# Patient Record
Sex: FEMALE | Race: WHITE | NOT HISPANIC OR LATINO
[De-identification: names, ages, dates, MRNs, and addresses within clinical notes are randomized per-mention and may not be internally consistent; named-entity substitution may affect disease eponyms.]

---

## 2019-05-23 ENCOUNTER — APPOINTMENT (OUTPATIENT)
Dept: FAMILY MEDICINE | Facility: CLINIC | Age: 59
End: 2019-05-23
Payer: COMMERCIAL

## 2019-05-23 ENCOUNTER — NON-APPOINTMENT (OUTPATIENT)
Age: 59
End: 2019-05-23

## 2019-05-23 VITALS
SYSTOLIC BLOOD PRESSURE: 129 MMHG | HEIGHT: 63.58 IN | WEIGHT: 171 LBS | BODY MASS INDEX: 29.92 KG/M2 | TEMPERATURE: 97.6 F | DIASTOLIC BLOOD PRESSURE: 79 MMHG | HEART RATE: 75 BPM | OXYGEN SATURATION: 100 %

## 2019-05-23 DIAGNOSIS — Z82.3 FAMILY HISTORY OF STROKE: ICD-10-CM

## 2019-05-23 DIAGNOSIS — R68.89 OTHER GENERAL SYMPTOMS AND SIGNS: ICD-10-CM

## 2019-05-23 DIAGNOSIS — Z80.1 FAMILY HISTORY OF MALIGNANT NEOPLASM OF TRACHEA, BRONCHUS AND LUNG: ICD-10-CM

## 2019-05-23 DIAGNOSIS — Z00.00 ENCOUNTER FOR GENERAL ADULT MEDICAL EXAMINATION W/OUT ABNORMAL FINDINGS: ICD-10-CM

## 2019-05-23 DIAGNOSIS — Z78.9 OTHER SPECIFIED HEALTH STATUS: ICD-10-CM

## 2019-05-23 DIAGNOSIS — F98.8 OTHER SPECIFIED BEHAVIORAL AND EMOTIONAL DISORDERS WITH ONSET USUALLY OCCURRING IN CHILDHOOD AND ADOLESCENCE: ICD-10-CM

## 2019-05-23 DIAGNOSIS — Z82.49 FAMILY HISTORY OF ISCHEMIC HEART DISEASE AND OTHER DISEASES OF THE CIRCULATORY SYSTEM: ICD-10-CM

## 2019-05-23 DIAGNOSIS — Z86.19 PERSONAL HISTORY OF OTHER INFECTIOUS AND PARASITIC DISEASES: ICD-10-CM

## 2019-05-23 DIAGNOSIS — Z87.891 PERSONAL HISTORY OF NICOTINE DEPENDENCE: ICD-10-CM

## 2019-05-23 DIAGNOSIS — G43.909 MIGRAINE, UNSPECIFIED, NOT INTRACTABLE, W/OUT STATUS MIGRAINOSUS: ICD-10-CM

## 2019-05-23 DIAGNOSIS — G47.00 INSOMNIA, UNSPECIFIED: ICD-10-CM

## 2019-05-23 PROCEDURE — 99204 OFFICE O/P NEW MOD 45 MIN: CPT | Mod: 25

## 2019-05-23 PROCEDURE — 93000 ELECTROCARDIOGRAM COMPLETE: CPT

## 2019-05-23 RX ORDER — CHROMIUM 200 MCG
TABLET ORAL
Refills: 0 | Status: ACTIVE | COMMUNITY

## 2019-05-23 RX ORDER — VALACYCLOVIR 1 G/1
1 TABLET, FILM COATED ORAL
Qty: 30 | Refills: 1 | Status: ACTIVE | COMMUNITY

## 2019-05-23 RX ORDER — ASPIRIN 325 MG/1
TABLET, FILM COATED ORAL
Refills: 0 | Status: ACTIVE | COMMUNITY

## 2019-05-23 NOTE — PHYSICAL EXAM
[No Acute Distress] : no acute distress [Normal Sclera/Conjunctiva] : normal sclera/conjunctiva [Normal Outer Ear/Nose] : the outer ears and nose were normal in appearance [Supple] : supple [No Respiratory Distress] : no respiratory distress  [Clear to Auscultation] : lungs were clear to auscultation bilaterally [No Accessory Muscle Use] : no accessory muscle use [Normal Rate] : normal rate  [Regular Rhythm] : with a regular rhythm [Normal S1, S2] : normal S1 and S2 [No Carotid Bruits] : no carotid bruits [No Edema] : there was no peripheral edema [No CVA Tenderness] : no CVA  tenderness [No Joint Swelling] : no joint swelling [Grossly Normal Strength/Tone] : grossly normal strength/tone [No Rash] : no rash [Normal Gait] : normal gait [Coordination Grossly Intact] : coordination grossly intact [No Focal Deficits] : no focal deficits [Speech Grossly Normal] : speech grossly normal [Memory Grossly Normal] : memory grossly normal [Normal Affect] : the affect was normal [Alert and Oriented x3] : oriented to person, place, and time [Normal Mood] : the mood was normal [Normal Insight/Judgement] : insight and judgment were intact

## 2019-05-23 NOTE — REVIEW OF SYSTEMS
[Chest Pain] : chest pain [Negative] : Heme/Lymph [FreeTextEntry5] : p [de-identified] : distractibility

## 2019-05-23 NOTE — HISTORY OF PRESENT ILLNESS
[FreeTextEntry8] : 60 yo female here for establishment of care. \par \par H/o herpes for many years. 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 Patient stating that multiple people in her building are "not feeling right" due to this. Stating that they are loulou hammering 9-5 every day. Patient feels that she at times stops in the middle of what she's doing and doesn't remember what she was about to do. This has been going on for a few months. FH of dementia in her mother. 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 H/o ADHD taking Ritalin in the past which helped. Also with some history of insomnia. Stating she is a night person, stays up late. No slurred speech, dizziness, weakness, or facial droop.\par \par Also with complaint of chest tightness and feels pulses pounding in her neck at times. Patient works out at the gym and does Pilates, as well as frequent walking. No CP when exercising. No SOB. No weight changes, hot or cold intolerance, or severe fatigue. No skin changes or hair loss. Symptoms are on and off. Stating it feels like it might be anxiety. Happens briefly for a few times daily. Lasts for a few seconds and then passes.

## 2019-06-02 ENCOUNTER — FORM ENCOUNTER (OUTPATIENT)
Age: 59
End: 2019-06-02

## 2019-06-03 ENCOUNTER — APPOINTMENT (OUTPATIENT)
Dept: ULTRASOUND IMAGING | Facility: CLINIC | Age: 59
End: 2019-06-03
Payer: COMMERCIAL

## 2019-06-03 ENCOUNTER — APPOINTMENT (OUTPATIENT)
Dept: MAMMOGRAPHY | Facility: CLINIC | Age: 59
End: 2019-06-03
Payer: COMMERCIAL

## 2019-06-03 ENCOUNTER — OUTPATIENT (OUTPATIENT)
Dept: OUTPATIENT SERVICES | Facility: HOSPITAL | Age: 59
LOS: 1 days | End: 2019-06-03

## 2019-06-03 PROCEDURE — 77067 SCR MAMMO BI INCL CAD: CPT | Mod: 26

## 2019-06-03 PROCEDURE — 77063 BREAST TOMOSYNTHESIS BI: CPT | Mod: 26

## 2019-06-03 PROCEDURE — 93880 EXTRACRANIAL BILAT STUDY: CPT | Mod: 26

## 2019-06-12 ENCOUNTER — TRANSCRIPTION ENCOUNTER (OUTPATIENT)
Age: 59
End: 2019-06-12

## 2019-06-12 ENCOUNTER — APPOINTMENT (OUTPATIENT)
Dept: HEART AND VASCULAR | Facility: CLINIC | Age: 59
End: 2019-06-12
Payer: COMMERCIAL

## 2019-06-12 VITALS
SYSTOLIC BLOOD PRESSURE: 123 MMHG | HEIGHT: 63 IN | HEART RATE: 74 BPM | BODY MASS INDEX: 30.3 KG/M2 | OXYGEN SATURATION: 95 % | DIASTOLIC BLOOD PRESSURE: 80 MMHG | WEIGHT: 171 LBS

## 2019-06-12 DIAGNOSIS — R07.89 OTHER CHEST PAIN: ICD-10-CM

## 2019-06-12 DIAGNOSIS — R00.1 BRADYCARDIA, UNSPECIFIED: ICD-10-CM

## 2019-06-12 PROCEDURE — 99204 OFFICE O/P NEW MOD 45 MIN: CPT

## 2019-06-12 NOTE — PHYSICAL EXAM
[Normal Appearance] : normal appearance [General Appearance - Well Developed] : well developed [Well Groomed] : well groomed [General Appearance - Well Nourished] : well nourished [No Deformities] : no deformities [General Appearance - In No Acute Distress] : no acute distress [Normal Conjunctiva] : the conjunctiva exhibited no abnormalities [Eyelids - No Xanthelasma] : the eyelids demonstrated no xanthelasmas [Normal Oral Mucosa] : normal oral mucosa [No Oral Pallor] : no oral pallor [No Oral Cyanosis] : no oral cyanosis [Normal Jugular Venous A Waves Present] : normal jugular venous A waves present [Normal Jugular Venous V Waves Present] : normal jugular venous V waves present [No Jugular Venous Martin A Waves] : no jugular venous martin A waves [Respiration, Rhythm And Depth] : normal respiratory rhythm and effort [Exaggerated Use Of Accessory Muscles For Inspiration] : no accessory muscle use [Auscultation Breath Sounds / Voice Sounds] : lungs were clear to auscultation bilaterally [Abdomen Tenderness] : non-tender [Abdomen Soft] : soft [Abdomen Mass (___ Cm)] : no abdominal mass palpated [Abnormal Walk] : normal gait [Gait - Sufficient For Exercise Testing] : the gait was sufficient for exercise testing [Cyanosis, Localized] : no localized cyanosis [Nail Clubbing] : no clubbing of the fingernails [Petechial Hemorrhages (___cm)] : no petechial hemorrhages [] : no ischemic changes [Mood] : the mood was normal [Oriented To Time, Place, And Person] : oriented to person, place, and time [Affect] : the affect was normal [No Anxiety] : not feeling anxious

## 2019-06-12 NOTE — REASON FOR VISIT
[Initial Evaluation] : an initial evaluation of [Chest Pain] : chest pain [FreeTextEntry1] : Ms. CANNON is 59 year woman who presents secondary chest discomfort. Her symptoms started about 2 months ago. She describes the pain as mid-sternal, discomfort that was pressure like in nature. Symptoms are worse with activity and improved at rest. They are associated with a shortness of breath. No prior work up reported.\par

## 2019-06-27 ENCOUNTER — APPOINTMENT (OUTPATIENT)
Dept: HEART AND VASCULAR | Facility: CLINIC | Age: 59
End: 2019-06-27

## 2019-07-01 ENCOUNTER — APPOINTMENT (OUTPATIENT)
Dept: GASTROENTEROLOGY | Facility: CLINIC | Age: 59
End: 2019-07-01
Payer: COMMERCIAL

## 2019-07-01 VITALS
DIASTOLIC BLOOD PRESSURE: 91 MMHG | BODY MASS INDEX: 30.3 KG/M2 | HEIGHT: 63 IN | WEIGHT: 171 LBS | OXYGEN SATURATION: 97 % | SYSTOLIC BLOOD PRESSURE: 159 MMHG | TEMPERATURE: 97.5 F | HEART RATE: 77 BPM

## 2019-07-01 DIAGNOSIS — Z12.11 ENCOUNTER FOR SCREENING FOR MALIGNANT NEOPLASM OF COLON: ICD-10-CM

## 2019-07-01 PROCEDURE — 99204 OFFICE O/P NEW MOD 45 MIN: CPT

## 2019-07-08 ENCOUNTER — FORM ENCOUNTER (OUTPATIENT)
Age: 59
End: 2019-07-08

## 2019-07-09 ENCOUNTER — OUTPATIENT (OUTPATIENT)
Dept: OUTPATIENT SERVICES | Facility: HOSPITAL | Age: 59
LOS: 1 days | End: 2019-07-09
Payer: COMMERCIAL

## 2019-07-09 DIAGNOSIS — R07.9 CHEST PAIN, UNSPECIFIED: ICD-10-CM

## 2019-07-09 PROCEDURE — 93351 STRESS TTE COMPLETE: CPT

## 2019-07-09 PROCEDURE — 93325 DOPPLER ECHO COLOR FLOW MAPG: CPT | Mod: 26

## 2019-07-09 PROCEDURE — 93320 DOPPLER ECHO COMPLETE: CPT | Mod: 26

## 2019-07-09 PROCEDURE — 93016 CV STRESS TEST SUPVJ ONLY: CPT

## 2019-07-09 PROCEDURE — 93018 CV STRESS TEST I&R ONLY: CPT

## 2019-07-09 PROCEDURE — 93350 STRESS TTE ONLY: CPT | Mod: 26

## 2019-08-13 ENCOUNTER — RESULT REVIEW (OUTPATIENT)
Age: 59
End: 2019-08-13

## 2019-08-13 ENCOUNTER — OUTPATIENT (OUTPATIENT)
Dept: OUTPATIENT SERVICES | Facility: HOSPITAL | Age: 59
LOS: 1 days | Discharge: HOME | End: 2019-08-13
Payer: COMMERCIAL

## 2019-08-13 ENCOUNTER — APPOINTMENT (OUTPATIENT)
Dept: GASTROENTEROLOGY | Facility: HOSPITAL | Age: 59
End: 2019-08-13

## 2019-08-13 PROCEDURE — 45385 COLONOSCOPY W/LESION REMOVAL: CPT

## 2019-08-13 PROCEDURE — 88305 TISSUE EXAM BY PATHOLOGIST: CPT

## 2019-08-14 LAB — SURGICAL PATHOLOGY STUDY: SIGNIFICANT CHANGE UP

## 2021-11-04 ENCOUNTER — OUTPATIENT (OUTPATIENT)
Dept: OUTPATIENT SERVICES | Facility: HOSPITAL | Age: 61
LOS: 1 days | End: 2021-11-04

## 2021-11-04 ENCOUNTER — APPOINTMENT (OUTPATIENT)
Dept: MAMMOGRAPHY | Facility: CLINIC | Age: 61
End: 2021-11-04
Payer: COMMERCIAL

## 2021-11-04 PROCEDURE — 77067 SCR MAMMO BI INCL CAD: CPT | Mod: 26

## 2021-11-04 PROCEDURE — 77063 BREAST TOMOSYNTHESIS BI: CPT | Mod: 26

## 2021-12-24 ENCOUNTER — TRANSCRIPTION ENCOUNTER (OUTPATIENT)
Age: 61
End: 2021-12-24

## 2022-12-19 ENCOUNTER — APPOINTMENT (OUTPATIENT)
Dept: MAMMOGRAPHY | Facility: CLINIC | Age: 62
End: 2022-12-19

## 2023-10-09 ENCOUNTER — EMERGENCY (EMERGENCY)
Facility: HOSPITAL | Age: 63
LOS: 1 days | Discharge: HOME | End: 2023-10-09
Admitting: EMERGENCY MEDICINE
Payer: COMMERCIAL

## 2023-10-09 VITALS
OXYGEN SATURATION: 97 % | DIASTOLIC BLOOD PRESSURE: 81 MMHG | HEART RATE: 75 BPM | SYSTOLIC BLOOD PRESSURE: 178 MMHG | TEMPERATURE: 98 F | RESPIRATION RATE: 16 BRPM

## 2023-10-09 VITALS
WEIGHT: 134.92 LBS | SYSTOLIC BLOOD PRESSURE: 186 MMHG | DIASTOLIC BLOOD PRESSURE: 80 MMHG | OXYGEN SATURATION: 98 % | HEART RATE: 89 BPM | TEMPERATURE: 98 F | RESPIRATION RATE: 18 BRPM

## 2023-10-09 PROCEDURE — 99284 EMERGENCY DEPT VISIT MOD MDM: CPT

## 2023-10-09 PROCEDURE — 70486 CT MAXILLOFACIAL W/O DYE: CPT | Mod: 26

## 2023-10-09 PROCEDURE — 70450 CT HEAD/BRAIN W/O DYE: CPT | Mod: 26

## 2023-10-09 PROCEDURE — 72125 CT NECK SPINE W/O DYE: CPT | Mod: 26

## 2023-10-09 PROCEDURE — 71250 CT THORAX DX C-: CPT | Mod: 26

## 2023-10-09 NOTE — ED ADULT NURSE NOTE - NSFALLRISKINTERV_ED_ALL_ED

## 2023-10-09 NOTE — ED PROVIDER NOTE - CLINICAL SUMMARY MEDICAL DECISION MAKING FREE TEXT BOX
63-year-old female, denies significant history, on daily aspirin as a cardioprotective measure, presenting to the emergency room after slip and fall in the bathroom yesterday while at work. Patient is noted to have tenderness to palpation along the left upper chest wall as well as the left lateral neck the right lateral neck and right parietal region of the scalp.  Will plan to obtain CT imaging of the head, maxillofacial, cervical spine, and chest wall.  She is found to have some bruising along the left dorsal hand, however when offered x-rays of the area, patient states her pain is not severe and she does not believe it to be broken, therefore refusing the x-ray.  Patient also refusing EKG stating she does not believe her chest wall pain to be cardiac.  Shared decision making involved and patient instructed to return to the ED if she develops any worsening of her chest wall or hand pain.  Will reassess and dispo pending medical work-up.

## 2023-10-09 NOTE — ED ADULT NURSE REASSESSMENT NOTE - NS ED NURSE REASSESS COMMENT FT1
Patient refusing EKG. Patient reinforced and educated the need for ekg. Patient verbalized understanding. ELIZABET Grant made aware. Care ongoing.

## 2023-10-09 NOTE — ED PROVIDER NOTE - NS ED ROS FT
+head pain, R lateral neck pain, left upper chest wall pain; +bruise over the L dorsal hand  Denies fevers, chills, nausea, vomiting, diarrhea, constipation, abdominal pain, urinary symptoms, palpitations, shortness of breath, dyspnea on exertion, syncope/near syncope, cough/URI symptoms, headache, weakness, numbness, focal deficits, visual changes, gait or balance changes, dizziness

## 2023-10-09 NOTE — ED PROVIDER NOTE - PROGRESS NOTE DETAILS
No acute findings on patient's CT imaging.  We will plan to discharge and recommend follow-up with primary care doctor.  Return precautions discussed and patient stable as she leaves.

## 2023-10-09 NOTE — ED PROVIDER NOTE - PATIENT PORTAL LINK FT
You can access the FollowMyHealth Patient Portal offered by [facility] by registering at the following website: [email or web address]. By joining [employer]’s FollowMyHealth portal, you will also be able to view your health information using other applications (apps) compatible with our system.

## 2023-10-09 NOTE — ED ADULT NURSE NOTE - OBJECTIVE STATEMENT
Patient is a 64 y/o F c/o fall. patient c/o fall that happened yesterday in bathroom at work. patient reports head strike and has bruising to left side of head. Patient also c/o left hand bruising and left sided rib pain. patient reports taking Aspirin daily but otherwise no other anticoagulants. Patient aaox4 and is able to ambulate with steady gait. patient also has left orbital bruising.

## 2023-10-09 NOTE — ED PROVIDER NOTE - PHYSICAL EXAMINATION
VITAL SIGNS: I have reviewed nursing notes and confirm.  CONSTITUTIONAL: Well-developed; well-nourished; in no acute distress.  SKIN: Skin is warm and dry, no acute rash.  HEAD: Normocephalic; Tenderness to palpation along the right parietal region without obvious hematoma or skin lesion.  NECK: Supple; +R Cervical paraspinal tenderness to palpation with full range of motion  CARD: S1, S2 normal; no murmurs, gallops, or rubs. Regular rate and rhythm.  RESP: No wheezes, rales or rhonchi.  SPINE: No spinal ttp and FROM.  EXT: Normal ROM. No clubbing, cyanosis or edema.  NEURO: Alert, oriented. Grossly unremarkable. CASIANO, normal tone, no gross motor or sensory changes. Fluent speech.   PSYCH: Cooperative, appropriate. Mood and affect wnl.

## 2023-10-09 NOTE — ED PROVIDER NOTE - NSFOLLOWUPINSTRUCTIONS_ED_ALL_ED_FT
Understanding Your Risk for Falls  Each year, millions of people have serious injuries from falls. It is important to understand your risk for falling. Talk with your health care provider about your risk and what you can do to lower it. There are actions you can take at home to lower your risk and prevent falls.    If you do have a serious fall, make sure to tell your health care provider. Falling once raises your risk of falling again.    How can falls affect me?  Serious injuries from falls are common. These include:  Broken bones, such as hip fractures.  Head injuries, such as traumatic brain injuries (TBI) or concussion.  A fear of falling can cause you to avoid activities and stay at home. This can make your muscles weaker and actually raise your risk for a fall.    What can increase my risk?  There are a number of risk factors that increase your risk for falling. The more risk factors you have, the higher your risk of falling. Serious injuries from a fall happen most often to people older than age 65. Children and young adults ages 15–29 are also at higher risk.    Common risk factors include:  Weakness in the lower body.  Lack (deficiency) of vitamin D.  Being generally weak or confused due to long-term (chronic) illness.  Dizziness or balance problems.  Poor vision.  Medicines that cause dizziness or drowsiness. These can include medicines for your blood pressure, heart, anxiety, insomnia, or edema, as well as pain medicines and muscle relaxants.  Other risk factors include:  Drinking alcohol.  Having had a fall in the past.  Having depression.  Having foot pain or wearing improper footwear.  Working at a dangerous job.  Having any of the following in your home:  Tripping hazards, such as floor clutter or loose rugs.  Poor lighting.  Pets.  Having dementia or memory loss.  What actions can I take to lower my risk of falling?      Physical activity    Maintain physical fitness. Do strength and balance exercises. Consider taking a regular class to build strength and balance. Yoga and raffy chi are good options.    Vision    Have your eyes checked every year and your vision prescription updated as needed.    Walking aids and footwear    Wear nonskid shoes. Do not wear high heels.  Do not walk around the house in socks or slippers.  Use a cane or walker as told by your health care provider.  Home safety    Attach secure railings on both sides of your stairs.  Install grab bars for your tub, shower, and toilet. Use a bath mat in your tub or shower.  Use good lighting in all rooms. Keep a flashlight near your bed.  Make sure there is a clear path from your bed to the bathroom. Use night-lights.  Do not use throw rugs. Make sure all carpeting is taped or tacked down securely.  Remove all clutter from walkways and stairways, including extension cords.  Repair uneven or broken steps.  Avoid walking on icy or slippery surfaces. Walk on the grass instead of on icy or slick sidewalks. Use ice melt to get rid of ice on walkways.  Use a cordless phone.  Questions to ask your health care provider  Can you help me check my risk for a fall?  Do any of my medicines make me more likely to fall?  Should I take a vitamin D supplement?  What exercises can I do to improve my strength and balance?  Should I make an appointment to have my vision checked?  Do I need a bone density test to check for weak bones or osteoporosis?  Would it help to use a cane or a walker?  Where to find more information  Centers for Disease Control and Prevention, [place]: www.cdc.gov  Community-Based Fall Prevention Programs: www.cdc.gov  National [place] on Aging: www.williams.nih.gov  Contact a health care provider if:  You fall at home.  You are afraid of falling at home.  You feel weak, drowsy, or dizzy.  Summary  Serious injuries from a fall happen most often to people older than age 65. Children and young adults ages 15–29 are also at higher risk.  Talk with your health care provider about your risks for falling and how to lower those risks.  Taking certain precautions at home can lower your risk for falling.  If you fall, always tell your health care provider.  This information is not intended to replace advice given to you by your health care provider. Make sure you discuss any questions you have with your health care provider.

## 2023-10-09 NOTE — ED PROVIDER NOTE - OBJECTIVE STATEMENT
63-year-old female, denies significant history, on daily aspirin as a cardioprotective measure, presenting to the emergency room after slip and fall in the bathroom yesterday while at work.  Patient works as a  in the [employer] when she excellently slipped and fell hitting the right side of her head.  She denies LOC in the event and was able to get up afterwards, however has noticed subsequent bruising below the left eye as well as on the left dorsum of her hand.  Patient is right-hand dominant.  She has not taken any medications for pain relief.  She initially thought she was fine but decided to get checked out due to the fall she sustained and her chronic aspirin use.  She also endorses some mild right-sided lateral neck pain and pain along the left upper lateral chest wall.  Denies headaches, dizziness, changes in vision, nausea, vomiting, fevers, chest pain or shortness of breath.

## 2023-10-09 NOTE — ED ADULT TRIAGE NOTE - CHIEF COMPLAINT QUOTE
here for mechanical slip and fall while using the bathroom at work yesterday. - pt with head strike, left sided rib pain, left occipital bruising and left hand bruising- pt is a+Ox3 denies LOC, neck pain or change in vision, takes aspirin daily

## 2023-10-11 DIAGNOSIS — Z79.82 LONG TERM (CURRENT) USE OF ASPIRIN: ICD-10-CM
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
## 2023-10-11 DIAGNOSIS — M54.2 CERVICALGIA: ICD-10-CM
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
## 2023-10-11 DIAGNOSIS — R07.89 OTHER CHEST PAIN: ICD-10-CM

## 2023-10-11 DIAGNOSIS — S09.90XA UNSPECIFIED INJURY OF HEAD, INITIAL ENCOUNTER: ICD-10-CM

## 2023-10-11 DIAGNOSIS — S60.222A CONTUSION OF LEFT HAND, INITIAL ENCOUNTER: ICD-10-CM
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
## 2023-10-12 ENCOUNTER — EMERGENCY (EMERGENCY)
Facility: HOSPITAL | Age: 63
LOS: 1 days | Discharge: HOME | End: 2023-10-12
Admitting: EMERGENCY MEDICINE
Payer: COMMERCIAL

## 2023-10-12 VITALS
RESPIRATION RATE: 16 BRPM | SYSTOLIC BLOOD PRESSURE: 166 MMHG | HEART RATE: 81 BPM | TEMPERATURE: 98 F | DIASTOLIC BLOOD PRESSURE: 80 MMHG | OXYGEN SATURATION: 98 % | WEIGHT: 160.06 LBS | HEIGHT: 66 IN

## 2023-10-12 PROCEDURE — 71046 X-RAY EXAM CHEST 2 VIEWS: CPT | Mod: 26

## 2023-10-12 PROCEDURE — 99284 EMERGENCY DEPT VISIT MOD MDM: CPT

## 2023-10-12 NOTE — ED PROVIDER NOTE - PATIENT PORTAL LINK FT
You can access the FollowMyHealth Patient Portal offered by [facility] by registering at the following website: [email or web address]. By joining [employer]’s FollowMyHealth portal, you will also be able to view your health information using other applications (apps) compatible with our system.

## 2023-10-12 NOTE — ED ADULT TRIAGE NOTE - CHIEF COMPLAINT QUOTE
Pt states trip/ fall tuesday, was seen here and discharged home. Pt states increased left rib and axillary pain. Denies sob or cough. Pt did not take any pain medications this am.

## 2023-10-12 NOTE — ED ADULT NURSE NOTE - NSFALLUNIVINTERV_ED_ALL_ED
Bed/Stretcher in lowest position, wheels locked, appropriate side rails in place/Call bell, personal items and telephone in reach/Instruct patient to call for assistance before getting out of bed/chair/stretcher/Non-slip footwear applied when patient is off stretcher/[place] to call system/Physically safe environment - no spills, clutter or unnecessary equipment/Purposeful proactive rounding/Room/bathroom lighting operational, light cord in reach

## 2023-10-12 NOTE — ED PROVIDER NOTE - MUSCULOSKELETAL, MLM
Spine appears normal, range of motion is not limited, + mild tenderness to anterolateral left chest wall without crepitus

## 2023-10-12 NOTE — ED ADULT NURSE NOTE - OBJECTIVE STATEMENT
64 y/o F here with left rib and axillary pain. Denies sob or cough. Pt did not take any pain medications this am. A&Ox4. Ambulatory. NKA

## 2023-10-12 NOTE — ED PROVIDER NOTE - NSFOLLOWUPINSTRUCTIONS_ED_ALL_ED_FT
USE THE INCENTIVE SPIROMETER TO PROTECT YOUR LUNGS AND PROMOTE DEEP BREATHING.     TAKE 650MG TYLENOL EVERY 6 HOURS AS NEEDED FOR PAIN.     TAKE IBUPROFEN 600MG EVERY 6 HOURS WITH FOOD AS NEEDED FOR PAIN.     AVOID HEAVY LIFTING (IF IT HURTS, DON'T LIFT IT) FOR THE NEXT 3-5 DAYS UNTIL FEELING IMPROVED.       DISCHARGE INSTRUCTIONS:    Call 911 for any of the following:     You have trouble breathing.      You have new or increased pain.    Return to the emergency department if:     Your pain does not get better, even after treatment.      You have a fever or a cough.     Contact your healthcare provider if:     You have questions or concerns about your condition or care.        Medicines:     NSAIDs help decrease swelling and pain. NSAIDs are available without a doctor's order. Ask your healthcare provider which medicine is right for you. Ask how much to take and when to take it. Take as directed. NSAIDs can cause stomach bleeding and kidney problems if not taken correctly.      Prescription pain medicine may be given. Ask your healthcare provider how to take this medicine safely. Some prescription pain medicines contain acetaminophen. Do not take other medicines that contain acetaminophen without talking to your healthcare provider. Too much acetaminophen may cause liver damage. Prescription pain medicine may cause constipation. Ask your healthcare provider how to prevent or treat constipation.       Take your medicine as directed. Contact your healthcare provider if you think your medicine is not helping or if you have side effects. Tell him or her if you are allergic to any medicine. Keep a list of the medicines, vitamins, and herbs you take. Include the amounts, and when and why you take them. Bring the list or the pill bottles to follow-up visits. Carry your medicine list with you in case of an emergency.    Follow up with your healthcare provider as directed: Write down your questions so you remember to ask them during your visits.    Deep breathing: Deep breathing will decrease your risk for pneumonia. Hug a pillow on the injured side while doing this exercise, to decrease pain. Take a deep breath and hold it for as long as possible. You should let the air out and then cough strongly. Deep breaths help open your airway. You may be given an incentive spirometer to help take deep breaths. Put the plastic piece in your mouth. Take a slow, deep breath. You should then let the air out and cough. Repeat these steps 10 times every hour.    Rest: Rest and limit activity to decrease swelling and pain, and allow your injury to heal. Avoid activities that may cause more pain or damage to your ribs such as, pulling, pushing, and lifting. As your pain decreases, begin movements slowly. Take short walks between rest periods.    Ice: Apply ice on the fractured area for 15 to 20 minutes every hour or as directed. Use an ice pack or put crushed ice in a plastic bag. Cover it with a towel. Ice helps prevent tissue damage and decreases swelling and pain.

## 2023-10-12 NOTE — ED PROVIDER NOTE - CLINICAL SUMMARY MEDICAL DECISION MAKING FREE TEXT BOX
pt presents with persistent left anterolateral chest wall pain after mechanical trip and fall. seen here on day of fall with negative ct chest. discussed results but given persistent pain and discomfort with breathing will obtain cxr to ensure no interval effusion or ptx. cxr negative. will d/c.

## 2023-10-12 NOTE — ED PROVIDER NOTE - OBJECTIVE STATEMENT
64yo F presents with left anterior chest wall pain after fall 2 days ago. pt was seen here at that time and had imaging done but did not know she had already had a CT chest. she denies fever, chills, cough, sob but reports some increased discomfort with deep breathing. denies ha, vision changes, nausea, vomiting, numbness, weakness, any other concerns.

## 2023-10-16 DIAGNOSIS — R07.89 OTHER CHEST PAIN: ICD-10-CM

## 2025-03-08 ENCOUNTER — EMERGENCY (EMERGENCY)
Facility: HOSPITAL | Age: 65
LOS: 1 days | Discharge: HOME | End: 2025-03-08
Attending: STUDENT IN AN ORGANIZED HEALTH CARE EDUCATION/TRAINING PROGRAM | Admitting: STUDENT IN AN ORGANIZED HEALTH CARE EDUCATION/TRAINING PROGRAM
Payer: COMMERCIAL

## 2025-03-08 VITALS
OXYGEN SATURATION: 99 % | HEIGHT: 65 IN | RESPIRATION RATE: 16 BRPM | SYSTOLIC BLOOD PRESSURE: 202 MMHG | HEART RATE: 69 BPM | TEMPERATURE: 98 F | DIASTOLIC BLOOD PRESSURE: 83 MMHG | WEIGHT: 149.91 LBS

## 2025-03-08 DIAGNOSIS — S42.402A UNSPECIFIED FRACTURE OF LOWER END OF LEFT HUMERUS, INITIAL ENCOUNTER FOR CLOSED FRACTURE: ICD-10-CM

## 2025-03-08 DIAGNOSIS — M25.522 PAIN IN LEFT ELBOW: ICD-10-CM
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
## 2025-03-08 DIAGNOSIS — M25.512 PAIN IN LEFT SHOULDER: ICD-10-CM

## 2025-03-08 PROCEDURE — 73030 X-RAY EXAM OF SHOULDER: CPT | Mod: 26

## 2025-03-08 PROCEDURE — 73060 X-RAY EXAM OF HUMERUS: CPT | Mod: 26,LT

## 2025-03-08 PROCEDURE — 73080 X-RAY EXAM OF ELBOW: CPT | Mod: 26,LT

## 2025-03-08 PROCEDURE — 99285 EMERGENCY DEPT VISIT HI MDM: CPT

## 2025-03-08 RX ORDER — OXYCODONE HYDROCHLORIDE 30 MG/1
5 TABLET ORAL ONCE
Refills: 0 | Status: DISCONTINUED | OUTPATIENT
Start: 2025-03-08 | End: 2025-03-08

## 2025-03-08 RX ORDER — ACETAMINOPHEN 500 MG/5ML
1000 LIQUID (ML) ORAL ONCE
Refills: 0 | Status: COMPLETED | OUTPATIENT
Start: 2025-03-08 | End: 2025-03-08

## 2025-03-08 RX ADMIN — Medication 1000 MILLIGRAM(S): at 23:03

## 2025-03-08 RX ADMIN — OXYCODONE HYDROCHLORIDE 5 MILLIGRAM(S): 30 TABLET ORAL at 23:04

## 2025-03-08 NOTE — ED ADULT NURSE NOTE - NSFALLRISKINTERV_ED_ALL_ED

## 2025-03-08 NOTE — ED ADULT TRIAGE NOTE - CHIEF COMPLAINT QUOTE
L elbow pain s/p single mechanical GLF. No headstrike reported. Swelling noted @ elbow but no gross deformity. Distal PMS intact. Denies shoulder, head, or neck pain. No preceding dizziness or weakness prior to the fall.

## 2025-03-08 NOTE — ED ADULT NURSE NOTE - OBJECTIVE STATEMENT
66 yo female c/o L elbow pain after falling. Pt reports she was leaving work trying to catch the train and she tripped, falling on her L arm. Denies numbness/tingling.

## 2025-03-08 NOTE — ED ADULT TRIAGE NOTE - WEIGHT IN LBS
How Severe Is It?: mild
Is This A New Presentation, Or A Follow-Up?: Rash
Additional History: Not active today
149.9

## 2025-03-09 VITALS
TEMPERATURE: 98 F | DIASTOLIC BLOOD PRESSURE: 73 MMHG | RESPIRATION RATE: 18 BRPM | SYSTOLIC BLOOD PRESSURE: 166 MMHG | OXYGEN SATURATION: 98 % | HEART RATE: 72 BPM

## 2025-03-09 PROCEDURE — 73090 X-RAY EXAM OF FOREARM: CPT | Mod: 26,LT,76

## 2025-03-09 PROCEDURE — 73060 X-RAY EXAM OF HUMERUS: CPT | Mod: 26,LT

## 2025-03-09 PROCEDURE — 73110 X-RAY EXAM OF WRIST: CPT | Mod: 26,LT

## 2025-03-09 PROCEDURE — 73090 X-RAY EXAM OF FOREARM: CPT

## 2025-03-09 PROCEDURE — 73080 X-RAY EXAM OF ELBOW: CPT

## 2025-03-09 PROCEDURE — 99285 EMERGENCY DEPT VISIT HI MDM: CPT | Mod: 25

## 2025-03-09 PROCEDURE — 24620 CLTX MONTEGGIA FX DISLC ELBW: CPT | Mod: LT

## 2025-03-09 PROCEDURE — 73060 X-RAY EXAM OF HUMERUS: CPT

## 2025-03-09 PROCEDURE — 73030 X-RAY EXAM OF SHOULDER: CPT

## 2025-03-09 PROCEDURE — 73080 X-RAY EXAM OF ELBOW: CPT | Mod: 26,LT

## 2025-03-09 PROCEDURE — 73110 X-RAY EXAM OF WRIST: CPT

## 2025-03-09 RX ORDER — LIDOCAINE HCL/PF 10 MG/ML
10 VIAL (ML) INJECTION ONCE
Refills: 0 | Status: COMPLETED | OUTPATIENT
Start: 2025-03-09 | End: 2025-03-09

## 2025-03-09 RX ORDER — OXYCODONE HYDROCHLORIDE 30 MG/1
1 TABLET ORAL
Qty: 6 | Refills: 0
Start: 2025-03-09 | End: 2025-03-11

## 2025-03-09 RX ADMIN — Medication 10 MILLILITER(S): at 04:40

## 2025-03-09 RX ADMIN — Medication 10 MILLILITER(S): at 01:49

## 2025-03-09 NOTE — ED PROVIDER NOTE - CLINICAL SUMMARY MEDICAL DECISION MAKING FREE TEXT BOX
no medical history, s/p trip and fall just prior to arrival. fell onto L elbow. no head strike, no preceding symptoms. severe pain through L elbow and upper arm. swelling to arm. no extremity weakness / numbness. denies other injuries. has been able to bear weight since.   pt appears uncomfortable but nontoxic, hypertensive (suspect 2/2 pain), vitals otherwise ok, exam LUE - skin intact, L elbow w swelling, ecchymosis, tenderness, limited ROM. shoulder, wrist, hand w/o tenderness. no snuff box tenderness. neurovascularly intact     high concern for elbow fracture vs dislocation.   will xr remainder of extremity consider proximal humerus fracture.   injuries all focal to LUE - hx and exam otherwise w/o evidence of trauma to require further imaging.   plan - xray  analgesia prn  likely ortho

## 2025-03-09 NOTE — ED PROVIDER NOTE - WR INTERPRETATION DATE TIME  1
Received [facility] paperwork to be completed-Dr Fisher and Dr Shukla   09-Mar-2025 06:01

## 2025-03-09 NOTE — ED PROVIDER NOTE - PROGRESS NOTE DETAILS
xr showing L elbow fracture.   ortho consulted - splinted in ED.   post reduction XR okayed. requesting CT for further evaluation.   CT done, cleared for dc by ortho. remains neurovascularly intact in splint. given sling for comfort   ok for dc and outpt follow w ortho. given follow up info. xr showing L elbow fracture.   ortho consulted - splinted in ED.   post reduction XR okayed. requesting CT for further evaluation.   CT done, cleared for dc by ortho. remains neurovascularly intact in splint. given sling for comfort   ok for dc and outpt follow w ortho. given follow up info.    All results reviewed with the patient verbally. Discharge plan and return precautions d/w pt who verbalized understanding and agrees with plan. All questions answered. Vitals WNL. Ready for d/c.

## 2025-03-09 NOTE — ED PROVIDER NOTE - PATIENT PORTAL LINK FT
You can access the FollowMyHealth Patient Portal offered by [facility] by registering at the following website: [email or web address]. By joining [employer]’s FollowMyHealth portal, you will also be able to view your health information using other applications (apps) compatible with our system.

## 2025-03-09 NOTE — ED PROVIDER NOTE - ATTENDING APP SHARED VISIT CONTRIBUTION OF CARE
64 yo F presents s/p fall onto L elbow p/w severe L elbow and upper arm pain, swelling, and ecchymosis.  No head strike, preceding symptoms, extremity weakness, numbness, or other injuries reported. Able to bear weight.  Appears uncomfortable but nontoxic; hypertensive (likely pain-related); otherwise stable vitals. Exam shows L elbow swelling, ecchymosis, tenderness, and limited ROM; remaining LUE unremarkable.  Plan: x-ray (elbow, consider proximal humerus); analgesia; likely orthopedics consult.  High concern for elbow fracture/dislocation.

## 2025-03-09 NOTE — ED PROVIDER NOTE - PHYSICAL EXAMINATION
LUE - skin intact, +swelling at elbow, ecchymosis. bony tenderness throughout elbow. unable to range 2/2 pain. proximal humerus and shoulder w/o bony tenderness. unable to range at shoulder 2/2 elbow pain. wrist, hand w/o bony tenderness, good ROM. no snuff box tenderness. 2+ radial pulses, cap refill <2 seconds. sensation intact all distributions distally, 5/5  strength.     Constitutional : appears uncomfortable but nontoxic. awake, alert, oriented to person, place, time/situation.  Head : head normocephalic, atraumatic  EENMT : eyes clear bilaterally, PERRL, EOMI. airway patent. moist mucous membranes. neck supple.  Cardiac : Normal rate, regular rhythm. No murmur appreciated, no LE edema.  Resp : Breath sounds clear and equal bilaterally. Respirations even and unlabored.   Gastro : abdomen soft, nontender, nondistended. no rebound or guarding. no abd or flank ecchymosis. .  MSK :  extremities otherwise - range of motion is not limited, no muscle or joint tenderness  Back : No evidence of trauma. No spinal tenderness.  Vasc : Extremities warm and well perfused. 2+ radial and DP pulses. cap refill <2 seconds  Neuro : Alert and oriented, CNII-XII grossly intact, no motor or sensory deficits.  Skin : Skin normal color for race, warm, dry and intact. No abrasions or lacerations.   Psych : Alert and oriented to person, place, time/situation. normal mood and affect. no apparent risk to self or others.

## 2025-03-09 NOTE — ED PROVIDER NOTE - NSFOLLOWUPINSTRUCTIONS_ED_ALL_ED_FT
You have an ELBOW FRACTURE    You were seen by the orthopedic team and you were placed in a splint    Keep splint clean, dry, in place until you are seen by orthopedics.     Take tylenol / motrin for pain.   Keep arm elevated to help with pain and swelling.     Follow up with orthopedics within 1 week. See information listed here call today to make an appointment.     Return for new or worsening symptoms including increased pain, increased swelling, difficulty moving your fingers, discoloration of the fingers, numbness or weakness to the hand or any other concerning symptoms.

## 2025-03-09 NOTE — CONSULT NOTE ADULT - SUBJECTIVE AND OBJECTIVE BOX
Orthopaedic Surgery Consult Note    For Surgeon: Dr. Shelley    HPI: Patient is a 65y old  Female who presents with a chief complaint of  Left elbow pain after a ground level fall.  Pt denies any numbness/tingling, head trauma/LOC. Endorses moderate pain to her wrist. Able to wiggle her fingers. Pt reports no pain with movement of the ipsilateral wrist, fingers, or shoulder.    Allergies  No Known Allergies  Intolerances      PAST MEDICAL & SURGICAL HISTORY:  No pertinent past medical history        MEDICATIONS  (STANDING):    MEDICATIONS  (PRN):      Vital Signs Last 24 Hrs  T(C): 36.7 (09 Mar 2025 07:43), Max: 36.7 (09 Mar 2025 05:03)  T(F): 98.1 (09 Mar 2025 07:43), Max: 98.1 (09 Mar 2025 07:43)  HR: 72 (09 Mar 2025 07:43) (68 - 72)  BP: 166/73 (09 Mar 2025 07:43) (166/73 - 202/83)  BP(mean): --  RR: 18 (09 Mar 2025 07:43) (16 - 18)  SpO2: 98% (09 Mar 2025 07:43) (96% - 99%)    Parameters below as of 09 Mar 2025 07:43  Patient On (Oxygen Delivery Method): room air        Physical Exam:  Left upper extremity  Skin intact, +Swelling of left elbow  Decreased rom Left elbow 2/2 pain  Pulses intact, brisk cap refill  Sensation intact M/R/U  Motor intact AIN/PIN/U    Imaging:   Xray Left elbow shows Bado II Monteggia fx.     Procedure: Using aseptic technique injected 10cc 1% lidocaine w/o epi into Left proximal ulna fracture site, performing hematoma block. Reduction performed, posterior slab splint applied. Patient tolerated procedure well, neurovasc intact afterwards.    A/P: 65yFemale with Left Proximal humerus with oaton fx  - NWB/sling  - Pain control  - ice/elevation  - Post splint xray reviewed, fracture well aligned   - F/u w/Dr. Shelley in office  - Discussed with Dr. Shelley    Ortho Pager [phone]   Orthopaedic Surgery Consult Note    For Surgeon: Dr. Shelley    HPI: Patient is a 65y old  Female who presents with a chief complaint of  Left elbow pain after a ground level fall.  Pt denies any numbness/tingling, head trauma/LOC. Endorses moderate pain to her wrist. Able to wiggle her fingers. Pt reports no pain with movement of the ipsilateral wrist, fingers, or shoulder. Pt was advised to remove all rings from her left hand and was educated on the possability of finger swelling causing rings to become stuck on and the potential damage that could occur.    Allergies  No Known Allergies  Intolerances      PAST MEDICAL & SURGICAL HISTORY:  No pertinent past medical history        MEDICATIONS  (STANDING):    MEDICATIONS  (PRN):      Vital Signs Last 24 Hrs  T(C): 36.7 (09 Mar 2025 07:43), Max: 36.7 (09 Mar 2025 05:03)  T(F): 98.1 (09 Mar 2025 07:43), Max: 98.1 (09 Mar 2025 07:43)  HR: 72 (09 Mar 2025 07:43) (68 - 72)  BP: 166/73 (09 Mar 2025 07:43) (166/73 - 202/83)  BP(mean): --  RR: 18 (09 Mar 2025 07:43) (16 - 18)  SpO2: 98% (09 Mar 2025 07:43) (96% - 99%)    Parameters below as of 09 Mar 2025 07:43  Patient On (Oxygen Delivery Method): room air        Physical Exam:  Left upper extremity  Skin intact, +Swelling of left elbow  Decreased rom Left elbow 2/2 pain  Pulses intact, brisk cap refill  Sensation intact M/R/U  Motor intact AIN/PIN/U    Imaging:   Xray Left elbow shows Bado II Monteggia fx.     Procedure: Using aseptic technique injected 10cc 1% lidocaine w/o epi into Left proximal ulna fracture site, performing hematoma block. Reduction performed, posterior slab splint applied. Patient tolerated procedure well, neurovasc intact afterwards.    A/P: 65yFemale with Left Proximal humerus with oaton fx  - NWB/sling  - Pain control  - f/u CT elbow non-contrast  - ice/elevation  - Post splint xray reviewed, fracture well aligned   - F/u w/Dr. Shelley in office  - Discussed with Dr. Shelley    Ortho Pager [phone]

## 2025-03-09 NOTE — ED PROVIDER NOTE - CARE PROVIDER_API CALL
Denis Shelley  Orthopaedic Surgery  [address] [place] [address], [id number]  [place] [address]  Phone: [phone]  Fax: [phone]  Follow Up Time:

## 2025-03-09 NOTE — ED PROVIDER NOTE - OBJECTIVE STATEMENT
65 yr old female, no medical history, presents to the Emergency Department w elbow pain. pt s/p trip and fall just prior to arrival. fell onto L elbow. no head strike, no loc, no ac/asa use. no preceding symptoms. severe pain through L elbow and upper arm. swelling to arm. no extremity weakness / numbness. denies other injuries. has been able to bear weight since. no headache, neck pain, back pain, lower extremity injuries. otherwise well recently no infectious symptoms.